# Patient Record
Sex: FEMALE | Race: WHITE | NOT HISPANIC OR LATINO | Employment: FULL TIME | URBAN - METROPOLITAN AREA
[De-identification: names, ages, dates, MRNs, and addresses within clinical notes are randomized per-mention and may not be internally consistent; named-entity substitution may affect disease eponyms.]

---

## 2018-06-10 ENCOUNTER — VIRTUAL VISIT (OUTPATIENT)
Dept: FAMILY MEDICINE | Facility: OTHER | Age: 28
End: 2018-06-10

## 2018-06-11 NOTE — PROGRESS NOTES
"Date:   Clinician: Willard Fiore  Clinician NPI: [id number]  Patient: Misa Carballo  Patient : 1990  Patient Address: [address] [place] [address]  Patient Phone: [phone]  Visit Protocol: URI  Patient Summary:  Misa is a 27 year old ( : 1990 ) female who initiated a Visit for cold, sinus infection, or influenza. When asked the question \"Please sign me up to receive news, health information and promotions. \", Misa responded \"No\".    Misa states her symptoms started gradually 3-6 days ago.   Her symptoms consist of malaise, myalgia, facial pain or pressure, a cough, nasal congestion, a sore throat, a headache, rhinitis, and tooth pain.   Symptom details     Nasal secretions: The color of her mucus is yellow and green.    Cough: Misa coughs a few times an hour and her cough is more bothersome at night. Phlegm does not come into her throat when she coughs.     Sore throat: Misa reports having mild throat pain (between 1-3 on a 10 point pain scale), does not have exudate on her tonsils, and can swallow liquids. She is not sure if the lymph nodes in her neck are enlarged. A rash has not appeared on the skin since the sore throat started.     Facial pain or pressure: The facial pain or pressure feels worse when bending over or leaning forward.     Headache: She states the headache is moderate (between 4-6 on a 10 point pain scale).     Tooth pain: The tooth pain is not caused by a cavity, recent dental work, or other mouth problems.      Misa denies having chills, wheezing, fever, dyspnea, and ear pain. She also denies having recent facial or sinus surgery in the past 60 days, double sickening (worsening symptoms after initial improvement), and having a sinus infection within the past year.   Within the past week, Misa has not been exposed to someone with strep throat. She has not recently been exposed to someone with " "influenza. Misa has not been in close contact with any high risk individuals.   Misa has taken an antibiotic medication in the past month. Antibiotic details as reported by the patient (free text): Macrobid (I?m almost done taking the 100mg capsules once every 12 hours for 5 days). This was for a UTI.    Weight: 153 lbs   Misa does not smoke or use smokeless tobacco.   She denies pregnancy and denies breastfeeding. She has menstruated in the past month.  MEDICATIONS: [{\"id\"=&gt;3558945, \"description\"=&gt;\"valacyclovir oral\"}, {\"id\"=&gt;9939471, \"description\"=&gt;\"Aviane oral\"}, {\"id\"=&gt;7018379, \"description\"=&gt;\"Zyrtec oral\"}, {\"id\"=&gt;6500579, \"description\"=&gt;\"Nexium 24HR oral\"}], ALLERGIES: []  Clinician Response:  Dear Misa,  Based on the information provided, you have acute bacterial sinusitis, also known as a sinus infection. Sinus infections are caused by bacteria or a virus and symptoms are almost always identical. The difference between the 2 types of infections is timing.  Sinus infections start as viral infections and symptoms improve on their own in about 7 days. If symptoms have not improved after 7 days or have even worsened, a bacterial infection may have developed.  Medication information  I am prescribing:     Amoxicillin-pot clavulanate 875-125 mg oral tablet. Take 1 tablet by mouth every 12 hours for 10 days. There are no refills with this prescription.   Antibiotics can cause an allergic reaction even if you have taken them without a problem in the past. If you develop a new rash, swelling, or difficulty breathing, stop the medication and be seen in a clinic or urgent care immediately.  A yeast infection is a side effect of taking antibiotics in some women. Please use [facility] to get treatment if you have symptoms of a yeast infection.  If you become pregnant during this course of treatment, stop taking the medication and contact your primary care provider.  Self care  The " following tips will keep you as comfortable as possible while you recover:     Rest    Drink plenty of water and other liquids    Take a hot shower to loosen congestion    Use throat lozenges    Gargle with warm salt water (1/4 teaspoon of salt per 8 ounce glass of water)    Suck on frozen items such as popsicles or ice cubes    Drink hot tea with lemon and honey    Take a spoonful of honey to reduce your cough     When to seek care  Please be seen in a clinic or urgent care if any of the following occur:     Symptoms do not start to improve after 3 days of treatment    New symptoms develop, or symptoms become worse     Call 911 or go to the emergency room if you feel that your throat is closing off, you suddenly develop a rash, you are unable to swallow fluids, you are drooling, or you are having difficulty breathing.   Diagnosis: Acute bacterial sinusitis  Diagnosis ICD: J01.90  Additional Clinician Notes: Please stop taking the Macrobid, if you?re going to take this antibiotic.   Alternatively, I would wait and finish the Macrobid before starting the new anabiotic as most sinus infections go away on their own. We especially recommend not using antibiotics until symptoms have been present for 7 to 10 days or longer. We recommend over-the-counter medications like Sudafed, Advil, and other cough and cold medicines.  Prescription: amoxicillin-pot clavulanate 875-125 mg oral tablet 20 tablet, 10 days supply. Take 1 tablet by mouth every 12 hours for 10 days. Refills: 0, Refill as needed: no, Allow substitutions: yes  Pharmacy: [facility] #[id number] - [phone] - [address], [place] [address]

## 2018-12-24 ENCOUNTER — VIRTUAL VISIT (OUTPATIENT)
Dept: FAMILY MEDICINE | Facility: OTHER | Age: 28
End: 2018-12-24

## 2018-12-26 NOTE — PROGRESS NOTES
"Date:   Clinician: Celeste Cheng  Clinician NPI: [id number]  Patient: Misa Carballo  Patient : 1990  Patient Address: [address] [place] [address]  Patient Phone: [phone]  Visit Protocol: UTI  Patient Summary:  Misa is a 27 year old ( : 1990 ) female who initiated a Visit for a presumed bladder infection. When asked the question \"Please sign me up to receive news, health information and promotions. \", Misa responded \"No\".   Her symptoms started 1-3 days ago and consist of urinary frequency, urgency, and dysuria.   Symptom details   Urine color: The color of her urine is yellow.    Denied symptoms include urinary incontinence, vaginal discharge, abdominal pain, vomiting, feeling as if the bladder is never empty, flank pain, chills, vaginal itching, foul-smelling urine, and nausea. She does not feel feverish.   Over-the-counter medications or home remedies used to relieve the current symptoms as reported by the patient (free text): iB profin and AZO tablets    Precipitating events  Misa denies having a sexually transmitted disease.  Pertinent medical history  Misa has had a bladder infection before and has had 2 in the past 12 months. Her most recent bladder infection was not within the last 4 weeks. Her current symptoms are similar to her previous bladder infection symptoms.   Sulfamethoxazole-trimethoprim (Bactrim DS) and nitrofurantoin (Macrobid) has been effective in treating her past bladder infections.   Misa does not get yeast infections when she takes antibiotics and has not been prescribed antibiotics to prevent frequent or repeated bladder infections in the past. She has not experienced problems or side effects with any of the common antibiotics used to treat bladder infections.   Misa does not have a history of kidney stones. She has not used a catheter or been a patient in a hospital or nursing home in the " past 2 weeks.   Misa does not smoke or use smokeless tobacco.   She denies pregnancy and denies breastfeeding. She has menstruated in the past month.   Additional information as reported by the patient (free text): I get UTI?s a few times a year. This feels exactly like the rest.    MEDICATIONS: valacyclovir oral, Aviane oral, Zyrtec oral, Nexium 24HR oral, ALLERGIES: NKDA  Clinician Response:  Dear Misa,  Based on the information you have provided, you likely have an acute urinary tract infection, also called a bladder infection. Bladder infections occur when bacteria from the outside of the body enters the urinary tract. Any part of the urinary system can be infected, but the bladder is the most common.  Medication information  I am prescribing:     Sulfamethoxazole-trimethoprim (Bactrim DS) 800-160 mg oral tablet. Take 1 tablet by mouth every 12 hours for 3 days. There are no refills with this prescription.   The medication I prescribed for your bladder infection is an antibiotic. Continue taking the medication until it is gone even if you feel better. If you get an upset stomach while taking antibiotics, taking the medication with food can help.   Yeast infections can be a common side effect of antibiotics. The most common symptom of a yeast infection is itchiness in and around the vagina. Other signs and symptoms include burning, redness, or a thick, white vaginal discharge that looks like cottage cheese and does not have a bad smell.  Self care  Urination helps to flush bacteria from the urinary tract. For this reason, drinking water and urinating often helps relieve some symptoms of a bladder infection and can decrease your risk of getting bladder infections in the future.  Other steps you can take to prevent future bladder infections include:     Wipe front to back after using the bathroom    Urinate after sexual intercourse    Avoid using deodorant sprays, douches, or powders in the vaginal area      When to seek care  Please make an appointment to be seen in a clinic or urgent care if any of the following occur:     You develop new symptoms or your symptoms become worse    You have medication side effects that make it difficult to take them as prescribed    Your symptoms do not improve within 1-2 days of starting treatment    You have symptoms of a bladder infection that return shortly after completing treatment     It is possible to have an allergic reaction to an antibiotic even if you have not had one in the past. If you notice a new rash, significant swelling, or difficulty breathing, stop taking this medication immediately and go to a clinic or urgent care.   Diagnosis: Acute uncomplicated bladder infection  Diagnosis ICD: N39.0  Prescription: sulfamethoxazole-trimethoprim (Bactrim DS) 800-160 mg oral tablet 6 tablet, 3 days supply. Take 1 tablet by mouth every 12 hours for 3 days. Refills: 0, Refill as needed: no, Allow substitutions: yes  Pharmacy: [id number] - [phone] - [address] [place] [address]

## 2022-02-23 ENCOUNTER — ANESTHESIA (OUTPATIENT)
Dept: OBGYN | Facility: CLINIC | Age: 32
End: 2022-02-23
Payer: COMMERCIAL

## 2022-02-23 ENCOUNTER — HOSPITAL ENCOUNTER (INPATIENT)
Facility: CLINIC | Age: 32
LOS: 1 days | Discharge: HOME HEALTH | End: 2022-02-24
Attending: STUDENT IN AN ORGANIZED HEALTH CARE EDUCATION/TRAINING PROGRAM | Admitting: STUDENT IN AN ORGANIZED HEALTH CARE EDUCATION/TRAINING PROGRAM
Payer: COMMERCIAL

## 2022-02-23 ENCOUNTER — ANESTHESIA EVENT (OUTPATIENT)
Dept: OBGYN | Facility: CLINIC | Age: 32
End: 2022-02-23
Payer: COMMERCIAL

## 2022-02-23 PROBLEM — Z36.89 ENCOUNTER FOR TRIAGE IN PREGNANT PATIENT: Status: ACTIVE | Noted: 2022-02-23

## 2022-02-23 LAB
ABO/RH(D): NORMAL
ANTIBODY SCREEN: NEGATIVE
GROUP B STREPTOCOCCUS (EXTERNAL): NEGATIVE
HEPATITIS B SURFACE ANTIGEN (EXTERNAL): NEGATIVE
HGB BLD-MCNC: 13.1 G/DL (ref 11.7–15.7)
HIV1+2 AB SERPL QL IA: NEGATIVE
HOLD SPECIMEN: NORMAL
PLATELET # BLD AUTO: 165 10E3/UL (ref 150–450)
RUBELLA ANTIBODY IGG (EXTERNAL): NORMAL
RUPTURE OF FETAL MEMBRANES BY ROM PLUS: NEGATIVE
SARS-COV-2 RNA RESP QL NAA+PROBE: NEGATIVE
SPECIMEN EXPIRATION DATE: NORMAL

## 2022-02-23 PROCEDURE — 250N000009 HC RX 250: Performed by: ANESTHESIOLOGY

## 2022-02-23 PROCEDURE — 120N000001 HC R&B MED SURG/OB

## 2022-02-23 PROCEDURE — 86901 BLOOD TYPING SEROLOGIC RH(D): CPT | Performed by: STUDENT IN AN ORGANIZED HEALTH CARE EDUCATION/TRAINING PROGRAM

## 2022-02-23 PROCEDURE — 00HU33Z INSERTION OF INFUSION DEVICE INTO SPINAL CANAL, PERCUTANEOUS APPROACH: ICD-10-PCS | Performed by: ANESTHESIOLOGY

## 2022-02-23 PROCEDURE — 722N000001 HC LABOR CARE VAGINAL DELIVERY SINGLE

## 2022-02-23 PROCEDURE — 258N000003 HC RX IP 258 OP 636: Performed by: STUDENT IN AN ORGANIZED HEALTH CARE EDUCATION/TRAINING PROGRAM

## 2022-02-23 PROCEDURE — 0HQ9XZZ REPAIR PERINEUM SKIN, EXTERNAL APPROACH: ICD-10-PCS | Performed by: STUDENT IN AN ORGANIZED HEALTH CARE EDUCATION/TRAINING PROGRAM

## 2022-02-23 PROCEDURE — 85018 HEMOGLOBIN: CPT | Performed by: STUDENT IN AN ORGANIZED HEALTH CARE EDUCATION/TRAINING PROGRAM

## 2022-02-23 PROCEDURE — 86850 RBC ANTIBODY SCREEN: CPT | Performed by: STUDENT IN AN ORGANIZED HEALTH CARE EDUCATION/TRAINING PROGRAM

## 2022-02-23 PROCEDURE — 10907ZC DRAINAGE OF AMNIOTIC FLUID, THERAPEUTIC FROM PRODUCTS OF CONCEPTION, VIA NATURAL OR ARTIFICIAL OPENING: ICD-10-PCS | Performed by: STUDENT IN AN ORGANIZED HEALTH CARE EDUCATION/TRAINING PROGRAM

## 2022-02-23 PROCEDURE — 86780 TREPONEMA PALLIDUM: CPT | Performed by: STUDENT IN AN ORGANIZED HEALTH CARE EDUCATION/TRAINING PROGRAM

## 2022-02-23 PROCEDURE — 36415 COLL VENOUS BLD VENIPUNCTURE: CPT | Performed by: STUDENT IN AN ORGANIZED HEALTH CARE EDUCATION/TRAINING PROGRAM

## 2022-02-23 PROCEDURE — 250N000011 HC RX IP 250 OP 636: Performed by: ANESTHESIOLOGY

## 2022-02-23 PROCEDURE — G0463 HOSPITAL OUTPT CLINIC VISIT: HCPCS

## 2022-02-23 PROCEDURE — 370N000003 HC ANESTHESIA WARD SERVICE

## 2022-02-23 PROCEDURE — 87635 SARS-COV-2 COVID-19 AMP PRB: CPT | Performed by: STUDENT IN AN ORGANIZED HEALTH CARE EDUCATION/TRAINING PROGRAM

## 2022-02-23 PROCEDURE — 84112 EVAL AMNIOTIC FLUID PROTEIN: CPT | Performed by: STUDENT IN AN ORGANIZED HEALTH CARE EDUCATION/TRAINING PROGRAM

## 2022-02-23 PROCEDURE — 85049 AUTOMATED PLATELET COUNT: CPT | Performed by: STUDENT IN AN ORGANIZED HEALTH CARE EDUCATION/TRAINING PROGRAM

## 2022-02-23 PROCEDURE — 250N000009 HC RX 250: Performed by: STUDENT IN AN ORGANIZED HEALTH CARE EDUCATION/TRAINING PROGRAM

## 2022-02-23 PROCEDURE — 250N000011 HC RX IP 250 OP 636: Performed by: STUDENT IN AN ORGANIZED HEALTH CARE EDUCATION/TRAINING PROGRAM

## 2022-02-23 PROCEDURE — 3E0R3BZ INTRODUCTION OF ANESTHETIC AGENT INTO SPINAL CANAL, PERCUTANEOUS APPROACH: ICD-10-PCS | Performed by: ANESTHESIOLOGY

## 2022-02-23 RX ORDER — EPHEDRINE SULFATE 50 MG/ML
5 INJECTION, SOLUTION INTRAMUSCULAR; INTRAVENOUS; SUBCUTANEOUS
Status: DISCONTINUED | OUTPATIENT
Start: 2022-02-23 | End: 2022-02-23 | Stop reason: HOSPADM

## 2022-02-23 RX ORDER — MISOPROSTOL 200 UG/1
800 TABLET ORAL
Status: DISCONTINUED | OUTPATIENT
Start: 2022-02-23 | End: 2022-02-23 | Stop reason: HOSPADM

## 2022-02-23 RX ORDER — HYDROCORTISONE 2.5 %
CREAM (GRAM) TOPICAL 3 TIMES DAILY PRN
Status: DISCONTINUED | OUTPATIENT
Start: 2022-02-23 | End: 2022-02-25 | Stop reason: HOSPADM

## 2022-02-23 RX ORDER — KETOROLAC TROMETHAMINE 30 MG/ML
30 INJECTION, SOLUTION INTRAMUSCULAR; INTRAVENOUS
Status: DISCONTINUED | OUTPATIENT
Start: 2022-02-23 | End: 2022-02-25 | Stop reason: HOSPADM

## 2022-02-23 RX ORDER — MODIFIED LANOLIN
OINTMENT (GRAM) TOPICAL
Status: DISCONTINUED | OUTPATIENT
Start: 2022-02-23 | End: 2022-02-25 | Stop reason: HOSPADM

## 2022-02-23 RX ORDER — IBUPROFEN 800 MG/1
800 TABLET, FILM COATED ORAL EVERY 6 HOURS PRN
Status: DISCONTINUED | OUTPATIENT
Start: 2022-02-23 | End: 2022-02-25 | Stop reason: HOSPADM

## 2022-02-23 RX ORDER — NALOXONE HYDROCHLORIDE 0.4 MG/ML
0.4 INJECTION, SOLUTION INTRAMUSCULAR; INTRAVENOUS; SUBCUTANEOUS
Status: DISCONTINUED | OUTPATIENT
Start: 2022-02-23 | End: 2022-02-23 | Stop reason: HOSPADM

## 2022-02-23 RX ORDER — PANTOPRAZOLE SODIUM 40 MG/1
40 TABLET, DELAYED RELEASE ORAL
Status: DISCONTINUED | OUTPATIENT
Start: 2022-02-24 | End: 2022-02-25 | Stop reason: HOSPADM

## 2022-02-23 RX ORDER — DOCUSATE SODIUM 100 MG/1
100 CAPSULE, LIQUID FILLED ORAL DAILY
Status: DISCONTINUED | OUTPATIENT
Start: 2022-02-24 | End: 2022-02-25 | Stop reason: HOSPADM

## 2022-02-23 RX ORDER — OXYTOCIN/0.9 % SODIUM CHLORIDE 30/500 ML
100-340 PLASTIC BAG, INJECTION (ML) INTRAVENOUS CONTINUOUS PRN
Status: DISCONTINUED | OUTPATIENT
Start: 2022-02-23 | End: 2022-02-25 | Stop reason: HOSPADM

## 2022-02-23 RX ORDER — LIDOCAINE 40 MG/G
CREAM TOPICAL
Status: DISCONTINUED | OUTPATIENT
Start: 2022-02-23 | End: 2022-02-23

## 2022-02-23 RX ORDER — ONDANSETRON 2 MG/ML
4 INJECTION INTRAMUSCULAR; INTRAVENOUS EVERY 6 HOURS PRN
Status: DISCONTINUED | OUTPATIENT
Start: 2022-02-23 | End: 2022-02-23

## 2022-02-23 RX ORDER — OXYTOCIN 10 [USP'U]/ML
10 INJECTION, SOLUTION INTRAMUSCULAR; INTRAVENOUS
Status: DISCONTINUED | OUTPATIENT
Start: 2022-02-23 | End: 2022-02-23 | Stop reason: HOSPADM

## 2022-02-23 RX ORDER — CARBOPROST TROMETHAMINE 250 UG/ML
250 INJECTION, SOLUTION INTRAMUSCULAR
Status: DISCONTINUED | OUTPATIENT
Start: 2022-02-23 | End: 2022-02-25 | Stop reason: HOSPADM

## 2022-02-23 RX ORDER — SODIUM CHLORIDE, SODIUM LACTATE, POTASSIUM CHLORIDE, CALCIUM CHLORIDE 600; 310; 30; 20 MG/100ML; MG/100ML; MG/100ML; MG/100ML
INJECTION, SOLUTION INTRAVENOUS CONTINUOUS PRN
Status: DISCONTINUED | OUTPATIENT
Start: 2022-02-23 | End: 2022-02-23 | Stop reason: HOSPADM

## 2022-02-23 RX ORDER — LIDOCAINE HYDROCHLORIDE AND EPINEPHRINE 15; 5 MG/ML; UG/ML
INJECTION, SOLUTION EPIDURAL PRN
Status: DISCONTINUED | OUTPATIENT
Start: 2022-02-23 | End: 2022-02-23

## 2022-02-23 RX ORDER — OXYTOCIN/0.9 % SODIUM CHLORIDE 30/500 ML
340 PLASTIC BAG, INJECTION (ML) INTRAVENOUS CONTINUOUS PRN
Status: DISCONTINUED | OUTPATIENT
Start: 2022-02-23 | End: 2022-02-23 | Stop reason: HOSPADM

## 2022-02-23 RX ORDER — MISOPROSTOL 200 UG/1
400 TABLET ORAL
Status: DISCONTINUED | OUTPATIENT
Start: 2022-02-23 | End: 2022-02-23 | Stop reason: HOSPADM

## 2022-02-23 RX ORDER — PROCHLORPERAZINE MALEATE 10 MG
10 TABLET ORAL EVERY 6 HOURS PRN
Status: DISCONTINUED | OUTPATIENT
Start: 2022-02-23 | End: 2022-02-23 | Stop reason: HOSPADM

## 2022-02-23 RX ORDER — CARBOPROST TROMETHAMINE 250 UG/ML
250 INJECTION, SOLUTION INTRAMUSCULAR
Status: DISCONTINUED | OUTPATIENT
Start: 2022-02-23 | End: 2022-02-23 | Stop reason: HOSPADM

## 2022-02-23 RX ORDER — NALOXONE HYDROCHLORIDE 0.4 MG/ML
0.2 INJECTION, SOLUTION INTRAMUSCULAR; INTRAVENOUS; SUBCUTANEOUS
Status: DISCONTINUED | OUTPATIENT
Start: 2022-02-23 | End: 2022-02-23 | Stop reason: HOSPADM

## 2022-02-23 RX ORDER — METHYLERGONOVINE MALEATE 0.2 MG/ML
200 INJECTION INTRAVENOUS
Status: DISCONTINUED | OUTPATIENT
Start: 2022-02-23 | End: 2022-02-23 | Stop reason: HOSPADM

## 2022-02-23 RX ORDER — LIDOCAINE 40 MG/G
CREAM TOPICAL
Status: DISCONTINUED | OUTPATIENT
Start: 2022-02-23 | End: 2022-02-23 | Stop reason: HOSPADM

## 2022-02-23 RX ORDER — OXYTOCIN/0.9 % SODIUM CHLORIDE 30/500 ML
340 PLASTIC BAG, INJECTION (ML) INTRAVENOUS CONTINUOUS PRN
Status: DISCONTINUED | OUTPATIENT
Start: 2022-02-23 | End: 2022-02-25 | Stop reason: HOSPADM

## 2022-02-23 RX ORDER — OXYTOCIN/0.9 % SODIUM CHLORIDE 30/500 ML
1-24 PLASTIC BAG, INJECTION (ML) INTRAVENOUS CONTINUOUS
Status: DISCONTINUED | OUTPATIENT
Start: 2022-02-23 | End: 2022-02-23 | Stop reason: HOSPADM

## 2022-02-23 RX ORDER — VALACYCLOVIR HYDROCHLORIDE 500 MG/1
500 TABLET, FILM COATED ORAL 2 TIMES DAILY
Status: ON HOLD | COMMUNITY
End: 2022-02-24

## 2022-02-23 RX ORDER — ONDANSETRON 2 MG/ML
4 INJECTION INTRAMUSCULAR; INTRAVENOUS EVERY 6 HOURS PRN
Status: DISCONTINUED | OUTPATIENT
Start: 2022-02-23 | End: 2022-02-23 | Stop reason: HOSPADM

## 2022-02-23 RX ORDER — OXYTOCIN 10 [USP'U]/ML
10 INJECTION, SOLUTION INTRAMUSCULAR; INTRAVENOUS
Status: DISCONTINUED | OUTPATIENT
Start: 2022-02-23 | End: 2022-02-25 | Stop reason: HOSPADM

## 2022-02-23 RX ORDER — NALBUPHINE HYDROCHLORIDE 10 MG/ML
2.5-5 INJECTION, SOLUTION INTRAMUSCULAR; INTRAVENOUS; SUBCUTANEOUS EVERY 6 HOURS PRN
Status: DISCONTINUED | OUTPATIENT
Start: 2022-02-23 | End: 2022-02-25 | Stop reason: HOSPADM

## 2022-02-23 RX ORDER — TRANEXAMIC ACID 10 MG/ML
1 INJECTION, SOLUTION INTRAVENOUS EVERY 30 MIN PRN
Status: DISCONTINUED | OUTPATIENT
Start: 2022-02-23 | End: 2022-02-25 | Stop reason: HOSPADM

## 2022-02-23 RX ORDER — PROCHLORPERAZINE 25 MG
25 SUPPOSITORY, RECTAL RECTAL EVERY 12 HOURS PRN
Status: DISCONTINUED | OUTPATIENT
Start: 2022-02-23 | End: 2022-02-23 | Stop reason: HOSPADM

## 2022-02-23 RX ORDER — ACETAMINOPHEN 325 MG/1
650 TABLET ORAL EVERY 4 HOURS PRN
Status: DISCONTINUED | OUTPATIENT
Start: 2022-02-23 | End: 2022-02-25 | Stop reason: HOSPADM

## 2022-02-23 RX ORDER — FENTANYL CITRATE 50 UG/ML
50-100 INJECTION, SOLUTION INTRAMUSCULAR; INTRAVENOUS
Status: DISCONTINUED | OUTPATIENT
Start: 2022-02-23 | End: 2022-02-23 | Stop reason: HOSPADM

## 2022-02-23 RX ORDER — TRANEXAMIC ACID 10 MG/ML
1 INJECTION, SOLUTION INTRAVENOUS EVERY 30 MIN PRN
Status: DISCONTINUED | OUTPATIENT
Start: 2022-02-23 | End: 2022-02-23 | Stop reason: HOSPADM

## 2022-02-23 RX ORDER — BISACODYL 10 MG
10 SUPPOSITORY, RECTAL RECTAL DAILY PRN
Status: DISCONTINUED | OUTPATIENT
Start: 2022-02-23 | End: 2022-02-25 | Stop reason: HOSPADM

## 2022-02-23 RX ORDER — ONDANSETRON 4 MG/1
4 TABLET, ORALLY DISINTEGRATING ORAL EVERY 6 HOURS PRN
Status: DISCONTINUED | OUTPATIENT
Start: 2022-02-23 | End: 2022-02-23 | Stop reason: HOSPADM

## 2022-02-23 RX ORDER — METOCLOPRAMIDE 10 MG/1
10 TABLET ORAL EVERY 6 HOURS PRN
Status: DISCONTINUED | OUTPATIENT
Start: 2022-02-23 | End: 2022-02-23 | Stop reason: HOSPADM

## 2022-02-23 RX ORDER — MISOPROSTOL 200 UG/1
800 TABLET ORAL
Status: DISCONTINUED | OUTPATIENT
Start: 2022-02-23 | End: 2022-02-25 | Stop reason: HOSPADM

## 2022-02-23 RX ORDER — IBUPROFEN 600 MG/1
600 TABLET, FILM COATED ORAL
Status: DISCONTINUED | OUTPATIENT
Start: 2022-02-23 | End: 2022-02-25 | Stop reason: HOSPADM

## 2022-02-23 RX ORDER — SODIUM CHLORIDE, SODIUM LACTATE, POTASSIUM CHLORIDE, CALCIUM CHLORIDE 600; 310; 30; 20 MG/100ML; MG/100ML; MG/100ML; MG/100ML
INJECTION, SOLUTION INTRAVENOUS CONTINUOUS
Status: DISCONTINUED | OUTPATIENT
Start: 2022-02-23 | End: 2022-02-23 | Stop reason: HOSPADM

## 2022-02-23 RX ORDER — SCOLOPAMINE TRANSDERMAL SYSTEM 1 MG/1
1 PATCH, EXTENDED RELEASE TRANSDERMAL ONCE
Status: DISCONTINUED | OUTPATIENT
Start: 2022-02-23 | End: 2022-02-23 | Stop reason: HOSPADM

## 2022-02-23 RX ORDER — ONDANSETRON 4 MG/1
4 TABLET, ORALLY DISINTEGRATING ORAL EVERY 6 HOURS PRN
Status: DISCONTINUED | OUTPATIENT
Start: 2022-02-23 | End: 2022-02-23

## 2022-02-23 RX ORDER — METOCLOPRAMIDE HYDROCHLORIDE 5 MG/ML
10 INJECTION INTRAMUSCULAR; INTRAVENOUS EVERY 6 HOURS PRN
Status: DISCONTINUED | OUTPATIENT
Start: 2022-02-23 | End: 2022-02-23 | Stop reason: HOSPADM

## 2022-02-23 RX ORDER — METHYLERGONOVINE MALEATE 0.2 MG/ML
200 INJECTION INTRAVENOUS
Status: DISCONTINUED | OUTPATIENT
Start: 2022-02-23 | End: 2022-02-25 | Stop reason: HOSPADM

## 2022-02-23 RX ORDER — MISOPROSTOL 200 UG/1
400 TABLET ORAL
Status: DISCONTINUED | OUTPATIENT
Start: 2022-02-23 | End: 2022-02-25 | Stop reason: HOSPADM

## 2022-02-23 RX ADMIN — LIDOCAINE HYDROCHLORIDE,EPINEPHRINE BITARTRATE 3 ML: 15; .005 INJECTION, SOLUTION EPIDURAL; INFILTRATION; INTRACAUDAL; PERINEURAL at 20:42

## 2022-02-23 RX ADMIN — Medication 2 MILLI-UNITS/MIN: at 17:20

## 2022-02-23 RX ADMIN — Medication 340 ML/HR: at 21:32

## 2022-02-23 RX ADMIN — FENTANYL CITRATE 100 MCG: 50 INJECTION, SOLUTION INTRAMUSCULAR; INTRAVENOUS at 18:29

## 2022-02-23 RX ADMIN — LIDOCAINE HYDROCHLORIDE,EPINEPHRINE BITARTRATE 2 ML: 15; .005 INJECTION, SOLUTION EPIDURAL; INFILTRATION; INTRACAUDAL; PERINEURAL at 20:45

## 2022-02-23 RX ADMIN — FENTANYL CITRATE 100 MCG: 50 INJECTION, SOLUTION INTRAMUSCULAR; INTRAVENOUS at 20:35

## 2022-02-23 RX ADMIN — Medication: at 21:06

## 2022-02-23 RX ADMIN — FENTANYL CITRATE 100 MCG: 50 INJECTION, SOLUTION INTRAMUSCULAR; INTRAVENOUS at 19:34

## 2022-02-23 RX ADMIN — Medication 10 ML/HR: at 20:46

## 2022-02-23 RX ADMIN — SODIUM CHLORIDE, POTASSIUM CHLORIDE, SODIUM LACTATE AND CALCIUM CHLORIDE: 600; 310; 30; 20 INJECTION, SOLUTION INTRAVENOUS at 14:46

## 2022-02-23 ASSESSMENT — ACTIVITIES OF DAILY LIVING (ADL)
ADLS_ACUITY_SCORE: 10
ADLS_ACUITY_SCORE: 3

## 2022-02-23 NOTE — H&P
[facility] Labor and Delivery History and Physical    Misa Carballo MRN# [id number]   Age: 31 year old YOB: 1990     Date of Admission:  2022    Primary care provider: No primary care provider on file.         HPI:   Misa Carballo is a 31 year old  at 39w4d by LMP c/w 8w1d US who presents with spontaneous contractions, mucous discharge and bloody show. She normally gets her prenatal care with [facility] and was planning delivery at [facility]. Notes some vaginal discharge, unsure if ROM. Notes normal fetal movement.     Pregnancy notable for:  1. History of macrosomia 10#5oz  2. Left ovarian cyst, 5.5cm  3. History of genital herpes           Pregnancy history:     OBSTETRIC HISTORY:    OB History    Para Term  AB Living   2 1 1 0 0 1   SAB IAB Ectopic Multiple Live Births   0 0 0 0 1      # Outcome Date GA Lbr Enrique/2nd Weight Sex Delivery Anes PTL Lv   2 Current            1 Term 20    F    ELVA     Prenatal Labs:   GBS neg  RPR neg    Rubella immune  Gc/Ch neg  O pos sarah neg   Hep B/C neg  HIV neg    Medication Prior to Admission  Medications Prior to Admission   Medication Sig Dispense Refill Last Dose     esomeprazole (NEXIUM) 20 MG capsule Take 20 mg by mouth every morning (before breakfast). Take 30-60 minutes before eating.   2022 at Unknown time     Multiple Vitamin (MULTIVITAMIN OR) Take 1 tablet by mouth daily.   2022 at Unknown time     valACYclovir (VALTREX) 500 MG tablet Take 500 mg by mouth 2 times daily   2022 at Unknown time     sertraline (ZOLOFT) 50 MG tablet Take 50 mg by mouth daily.      .       Maternal Past Medical History:   History reviewed. No pertinent past medical history.       Maternal Past Surgical History:   History reviewed. No pertinent surgical history.          Family History:   History reviewed. No pertinent family history.         Physical Exam:     Vitals:    22 1150    Resp: 16   Temp: 97.9  F (36.6  C)   TempSrc: Oral     Gen: Well appearing, no apparent distress  Cardio: RRR  Resp: Breathing comfortably on room air  Abdomen: gravid, soft, nontender. EFW 8.5#  : no herpes lesions   Cervix: 3.5/70 per RN x2  Presentation:Cephalic by cervical exam    Fetal Heart Rate Tracing: Baseline 140, moderate variability, accels present, no decels  Tocometer: 2 contractions in 10 minutes    Imaging:  F/u growth US 2/3/22  FEW 3347g 84%, AC 92%    Anatomy US normal with no change in left adnexal lesion         Assessment:   Misa Carballo is a 31 year old  at 39w4d by LMP c/w 8w1d US admitted for augmentation of early labor. Discussed option of discharge to home vs augmentation and she desires to stay for augmentation.        Plan:     #Augmentation of labor  - Admit to labor and delivery   - Plan for AROM +\- pitocin once labs collected and IV in place  - Membranes: intact  - Labs: CBC, T&S, RPR, Covid ordered   - GBS neg; Antibiotics not indicated  - Pain Control: Desires epidural in active labor  - Diet: Regular in early labor.  - FWB: Cat I tracing  - Anticipate      History of macrosomia 10#5oz  - last growth US 3347g 84%    History of genital herpes   - taking prophylactic Valtrex  - no lesions seen on exam     Priscilla Dennis MD

## 2022-02-23 NOTE — PROVIDER NOTIFICATION
MD at bedside. Pt cervix unchanged but due to pt anitra and being uncomfortable will keep for induction. GBS neg. Pt is a divert from [facility], was scheduled for induction tomorrow due to hx of macrosomia.

## 2022-02-23 NOTE — PROVIDER NOTIFICATION
02/23/22 1714   Provider Notification   Provider Name/Title Dr Dennis   Method of Notification In Department   Request Evaluate - Remote   Notification Reason Uterine Activity   Updated MD that cxns are 2-8 minutes apart, more spaced than not, sve at 1400. MD wrote orders to start pitocin and no sve needed now since pt still very comfortable.

## 2022-02-23 NOTE — PLAN OF CARE
Data: [facility]: 2022 11:00 AM.  Reason for maternal/fetal assessment is uterine contractions. Patient reports getting membranes stripped in the clinic yesterday, has had intermittent contractions with mucus and some spotting since.Pt also feels that she may be leaking a small amount but cant tell if its just cervical mucous. Patient is a .  Prenatal record reviewed. Pregnancy  has been complicated by hx of HSV, taking valtrex, no current outbreak, and hx of macrosomia (10lb 5 oz at 41 weeks, no shoulder dystocia).  Gestational Age Unknown. VSS. Fetal movement active. Patient denies nausea, vomiting, headache, visual disturbances, epigastric or URQ pain, significant edema. Support person is present.   Action: Verbal consent for EFM. Triage assessment completed. Bill of rights reviewed.  Response: Patient verbalized agreement with plan. Will contact Dr Priscilla Dennis with update and for further orders.

## 2022-02-23 NOTE — PROVIDER NOTIFICATION
Dr Priscilla Dennis informed of patient arrival and assessment including the following:    Reason for maternal/fetal assessment uterine contractions. Pt reports getting membranes stripped in the clinic yesterday, has had intermittent contractions with mucus and some spotting since.Pt also feels that she may be leaking a small amount but cant tell if its just cervical mucous. Patient is a .  Prenatal record reviewed. Pregnancy  has been complicated by hx of HSV, taking valtrex, no current outbreak, and hx of macrosomia (10lb 5 oz at 41 weeks, no shoulder dystocia).. Fetal status normal baseline, moderate variability, accelerations present and no decelerations. Plan per provider/orders received for ROM plus and cervical exam.

## 2022-02-24 VITALS
RESPIRATION RATE: 16 BRPM | DIASTOLIC BLOOD PRESSURE: 81 MMHG | SYSTOLIC BLOOD PRESSURE: 127 MMHG | TEMPERATURE: 97.7 F | HEART RATE: 76 BPM

## 2022-02-24 LAB — T PALLIDUM AB SER QL: NONREACTIVE

## 2022-02-24 PROCEDURE — 120N000001 HC R&B MED SURG/OB

## 2022-02-24 PROCEDURE — 250N000013 HC RX MED GY IP 250 OP 250 PS 637: Performed by: STUDENT IN AN ORGANIZED HEALTH CARE EDUCATION/TRAINING PROGRAM

## 2022-02-24 RX ORDER — DOCUSATE SODIUM 100 MG/1
100 CAPSULE, LIQUID FILLED ORAL DAILY
Qty: 30 CAPSULE | Refills: 0 | Status: SHIPPED | OUTPATIENT
Start: 2022-02-24

## 2022-02-24 RX ORDER — MODIFIED LANOLIN
OINTMENT (GRAM) TOPICAL
Qty: 7 G | Refills: 0 | Status: SHIPPED | OUTPATIENT
Start: 2022-02-24

## 2022-02-24 RX ORDER — IBUPROFEN 800 MG/1
800 TABLET, FILM COATED ORAL EVERY 6 HOURS PRN
Qty: 40 TABLET | Refills: 0 | Status: SHIPPED | OUTPATIENT
Start: 2022-02-24

## 2022-02-24 RX ORDER — HYDROCORTISONE 2.5 %
CREAM (GRAM) TOPICAL 3 TIMES DAILY PRN
Qty: 30 G | Refills: 0 | Status: SHIPPED | OUTPATIENT
Start: 2022-02-24

## 2022-02-24 RX ADMIN — ACETAMINOPHEN 650 MG: 325 TABLET, FILM COATED ORAL at 12:23

## 2022-02-24 RX ADMIN — ACETAMINOPHEN 650 MG: 325 TABLET, FILM COATED ORAL at 16:21

## 2022-02-24 RX ADMIN — DOCUSATE SODIUM 100 MG: 100 CAPSULE, LIQUID FILLED ORAL at 11:12

## 2022-02-24 RX ADMIN — IBUPROFEN 800 MG: 800 TABLET ORAL at 21:35

## 2022-02-24 RX ADMIN — ACETAMINOPHEN 650 MG: 325 TABLET, FILM COATED ORAL at 21:34

## 2022-02-24 RX ADMIN — ACETAMINOPHEN 650 MG: 325 TABLET, FILM COATED ORAL at 07:54

## 2022-02-24 RX ADMIN — ACETAMINOPHEN 650 MG: 325 TABLET, FILM COATED ORAL at 03:46

## 2022-02-24 RX ADMIN — IBUPROFEN 800 MG: 800 TABLET ORAL at 00:44

## 2022-02-24 RX ADMIN — IBUPROFEN 800 MG: 800 TABLET ORAL at 14:49

## 2022-02-24 RX ADMIN — PANTOPRAZOLE SODIUM 40 MG: 40 TABLET, DELAYED RELEASE ORAL at 06:42

## 2022-02-24 RX ADMIN — IBUPROFEN 800 MG: 800 TABLET ORAL at 06:42

## 2022-02-24 ASSESSMENT — ACTIVITIES OF DAILY LIVING (ADL)
ADLS_ACUITY_SCORE: 3
ADLS_ACUITY_SCORE: 5
ADLS_ACUITY_SCORE: 3
ADLS_ACUITY_SCORE: 5
ADLS_ACUITY_SCORE: 3
ADLS_ACUITY_SCORE: 5
ADLS_ACUITY_SCORE: 3

## 2022-02-24 NOTE — PROGRESS NOTES
[facility]  Labor Progress Note    S:  Patient feeling some contractions still, agreeable to AROM.    O:   Patient Vitals for the past 4 hrs:   BP Temp Temp src Resp   22 1513 118/74 98.5  F (36.9  C) Oral 16     SVE: 3.5/70/-2, AROM at 1800 with clear fluid    FHT: Baseline 140, moderate variability, accelerations present, occasional early decelerations  [place]: 3 contractions in 10 minutes    A/P:  Ms. Misa Carballo is a 31 year old  at 39w4d by LMP c/w 8w1d US, here for augmentation of early labor.    Labor: - continue titrating pitocin to ctx, now s/p AROM   - Monitoring: external, continuous   - GBS neg   - Pain control: fentanyl>epidural   - anticipate        Leah Henke, MD [facility]  22 6:15 PM

## 2022-02-24 NOTE — L&D DELIVERY NOTE
Delivery Summary    Misa Carballo MRN# [id number]   Age: 31 year old YOB: 1990     Misa Carballo is a 31 year old  at 39w4d by LMP c/w 8w1d US who was admitted for augmentation of early labor on 2022. Pregnancy was notable for history of macrosomia, left ovarian cyst and history of genital herpes. Upon admission her cervix was 3.5/70/-2. She received pitocin for augmentation. AROM was performed at  with clear fluid. She received an epidural with good pain relief. She was complete at  and began pushing at . At  a vigorous female infant was delivered in the OA position with apgars of 9 and 9. Weight 3750g. Delayed cord clamping was done for >60 seconds. The placenta delivered spontaneously, intact with a 3 vessel cord at . Pitocin was given after delivery of the baby. A 1st degree laceration was present and was repaired in the standard fashion with 3-0 Monocryl. QBL of 50mL. Sponge and sharp counts were correct.     Priscilla Dennis MD         Haris, Female-Misa [[id number]]    Labor Event Times    Labor onset date: 22 Onset time:  7:30 PM   Dilation complete date: 22 Complete time:  9:15 PM   Start pushing date/time: 2022      Labor Length    1st Stage (hrs): 1 (min): 45   2nd Stage (hrs): 0 (min): 14   3rd Stage (hrs): 0 (min): 3      Labor Events     labor?: No   steroids: None  Labor Type: AROM, Augmentation  Predominate monitoring during 1st stage: continuous electronic fetal monitoring     Antibiotics received during labor?: No     Rupture date/time: 228   Rupture type: Artificial Rupture of Membranes  Fluid color: Clear  Fluid odor: Normal     Augmentation: Oxytocin  Augmentation date/time: 22   Indications for augmentation: Ineffective Contraction Pattern  1:1 continuous labor support provided by?: RN Labor partogram used?: no      Delivery/Placenta Date and Time    Delivery  "Date: 22 Delivery Time:  9:29 PM   Placenta Date/Time: 2022  9:33 PM  Oxytocin given at the time of delivery: after delivery of baby  Delivering clinician: Priscilla Dennis MD   Other personnel present at delivery:  Provider Role   Jenny Maria, RN Delivery Nurse   Avril Baig, RN Delivery Assist         Vaginal Counts     Initial count performed by 2 team members:  Two Team Members   Jenny Dennis       Needles Suture Needles Sponges (RETIRED) Instruments   Initial counts 2  5    Added to count  1     Relief counts       Final counts 2 1 5          Placed during labor Accounted for at the end of labor   FSE No NA   IUPC No NA   Cervadil No NA              Final count performed by 2 team members:  Two Team Members   Kya Dennis      Final count correct?: Yes     Apgars    Living status: Living   1 Minute 5 Minute 10 Minute 15 Minute 20 Minute   Skin color: 1  1       Heart rate: 2  2       Reflex irritability: 2  2       Muscle tone: 2  2       Respiratory effort: 2  2       Total: 9  9       Apgars assigned by: ERIC MICHELLE     Cord    Vessels: 3 Vessels    Cord Complications: None               Cord Blood Disposition: Lab    Gases Sent?: No    Delayed cord clamping?: Yes    Cord Clamping Delay (seconds):  seconds    Stem cell collection?: No        Resuscitation    Methods: None  Output in Delivery Room: Voided     [place] Measurements    Weight: 8 lb 4.3 oz Length: 1' 7.49\"   Head circumference: 37 cm    Output in delivery room: Voided     Skin to Skin and Feeding Plan    Skin to skin initiation date/time: 1841    Skin to skin with: Mother  Skin to skin end date/time:     Breastfeeding initiated date/time: 2022     Labor Events and Shoulder Dystocia    Fetal Tracing Prior to Delivery: Category 1  Shoulder dystocia present?: Neg     Delivery (Maternal) (Provider to Complete) ([id number])    Episiotomy: None  Perineal lacerations: 1st Repaired?: Yes "   Repair suture: 3-0 Monocryl  Number of repair packets: 1  Genital tract inspection done: Pos     Blood Loss  Mother: Misa Carballo #[id number]   Start of Mother's Information    Delivery Blood Loss  02/23/22 1930 - 02/24/22 0224    Delivery QBL (mL) Hospital Encounter 50 mL    Total  50 mL         End of Mother's Information  Mother: Misa Carballo #[id number]          Delivery - Provider to Complete ([id number])    Delivering clinician: Priscilla Dennis MD  Attempted Delivery Types (Choose all that apply): Spontaneous Vaginal Delivery  Delivery Type (Choose the 1 that will go to the Birth History): Vaginal, Spontaneous                   Other personnel:  Provider Role   Jenny Maria, RN Delivery Nurse   Avril Baig RN Delivery Assist                Placenta    Date/Time: 2/23/2022  9:33 PM  Removal: Expressed  Disposition: Hospital disposal           Anesthesia    Method: Epidural  Cervical dilation at placement: 0-3                Presentation and Position    Presentation: Vertex    Position: Middle Occiput Anterior                 Priscilla Dennis MD

## 2022-02-24 NOTE — PROGRESS NOTES
Patient had a vaginal delivery. VSS. Fundus firm. Lochia scant. Breastfeeding and demonstrating positive bonding behaviors with . Pain controlled with pain medications (see eMar). Will continue to monitor.

## 2022-02-24 NOTE — PLAN OF CARE
Data: Misa Carballo transferred to 452 via wheelchair at 1205. Baby transferred via parent's arms.  Action: Receiving unit notified of transfer: Yes. Patient and family notified of room change. Report given to Filomena REYES at 1205. Belongings sent to receiving unit. Accompanied by Registered Nurse. Oriented patient to surroundings. Call light within reach. ID bands double-checked with receiving RN.  Response: Patient tolerated transfer and is stable.    Patients mobililty level scored using the bedside mobility assistance tool (BMAT). Patient is at a mobility level test number: 3. Mobility equipment used: none required. Required assist of 1 staff members. Further use of BMAT scoring not required.   Brain metastases

## 2022-02-24 NOTE — PROVIDER NOTIFICATION
02/23/22 2115   Provider Notification   Provider Name/Title    Request Attend Delivery     Pt got an epidural at 2050, feeling pressure. SVE complete.  called to attend delivery.

## 2022-02-24 NOTE — ANESTHESIA PREPROCEDURE EVALUATION
Anesthesia Pre-Procedure Evaluation    Patient: Misa Carballo   MRN: [id number] : 1990        Procedure :           History reviewed. No pertinent past medical history.   History reviewed. No pertinent surgical history.   No Known Allergies   Social History     Tobacco Use     Smoking status: Never Smoker     Smokeless tobacco: Never Used   Substance Use Topics     Alcohol use: Yes      Wt Readings from Last 1 Encounters:   13 62.1 kg (136 lb 14.4 oz)        Anesthesia Evaluation   Pt has had prior anesthetic. Type: OB Labor Epidural.    No history of anesthetic complications       ROS/MED HX  ENT/Pulmonary:  - neg pulmonary ROS     Neurologic:  - neg neurologic ROS     Cardiovascular:  - neg cardiovascular ROS     METS/Exercise Tolerance:     Hematologic:  - neg hematologic  ROS   (+) no anticoagulation therapy,     Musculoskeletal:   (+) kyphoscoliosis lumbar spine     GI/Hepatic:       Renal/Genitourinary:       Endo:  - neg endo ROS     Psychiatric/Substance Use:       Infectious Disease:       Malignancy:       Other:   Spontaneous labor         Physical Exam    Airway        Mallampati: III    Neck ROM: full     Respiratory Devices and Support         Dental           Cardiovascular   cardiovascular exam normal          Pulmonary   pulmonary exam normal                OUTSIDE LABS:  CBC:   Lab Results   Component Value Date    HGB 13.1 2022     2022     BMP: No results found for: NA, POTASSIUM, CHLORIDE, CO2, BUN, CR, GLC  COAGS: No results found for: PTT, INR, FIBR  POC: No results found for: BGM, HCG, HCGS  HEPATIC: No results found for: ALBUMIN, PROTTOTAL, ALT, AST, GGT, ALKPHOS, BILITOTAL, BILIDIRECT, MAIKEL  OTHER: No results found for: PH, LACT, A1C, NERISSA, PHOS, MAG, LIPASE, AMYLASE, TSH, T4, T3, CRP, SED    Anesthesia Plan    ASA Status:  2      Anesthesia Type: Epidural.              Consents    Anesthesia Plan(s) and associated risks, benefits, and realistic  alternatives discussed. Questions answered and patient/representative(s) expressed understanding.    - Discussed:     - Discussed with:  Patient         Postoperative Care            Comments:    Other Comments: Continuous Labor Epidural: Indication is for labor pain. Following an discussion of the procedure, epidural expectations, and risks and benefits (risks including, but not limited to, nerve damage, infection, bleeding/hematoma, inadvertent dural puncture, spinal headache, partial or failed block), the patient appears to understand and consents to proceed. Questions were encouraged and answered.        neg OB ROS.       Lashay Pardo MD

## 2022-02-24 NOTE — PROVIDER NOTIFICATION
02/23/22 1805   Provider Notification   Provider Name/Title Dr Dennis   Method of Notification At Bedside   Notification Reason SVE   MD bedside, sve and amniotomy done.

## 2022-02-24 NOTE — PROVIDER NOTIFICATION
02/23/22 1945   Provider Notification   Provider Name/Title    Method of Notification In Department   Notification Reason Status Update      updated on pt's status. Breathing through and coping with contractions. Second dose of fentanyl IV given at 1934. Pt will want an epidural soon. FHR category 1.

## 2022-02-24 NOTE — ANESTHESIA POSTPROCEDURE EVALUATION
Patient: Misa Carballo      S/P epidural for labor.   I or my partner was immediately available for management of this patient during epidural analgesia infusion.  VSS.  Doing well. Block resolved.  Neuro at baseline. Denies positional headache. Minimal side effects easily managed w/ PRN meds. No apparent anesthetic complications. No follow-up required.    Morales Hammer MD    Note:  Anesthesia Post Evaluation    Last vitals:  Vitals Value Taken Time   BP     Temp     Pulse     Resp     SpO2         Electronically Signed By: Morales Hammer MD  February 24, 2022  10:19 AM

## 2022-02-24 NOTE — PLAN OF CARE
Room orientation completed with patient including how to use the call light, calling for help, infant safety- including family calling a code, use of bulb syringe, safe sleep techniques, fall prevention, and plan of care. Parents and baby's ID Bracelets checked and verified.     Patient vitally stable. Voiding without issue. Tolerating regular diet. Ambulating steadily, independently. PIV saline locked. Fundal checks WDL. Lochia is scant, no clots. Laceration managed with ice, tucks pads, and malorie bottle. Pain adequately managed with Tylenol and Ibuprofen. Infant breastfeeding every 2-3 hours. Mother reports nipple soreness with breastfeeding and that this occurred with her first baby as well- in that case she  for 1 month and then exclusively pumped. Baby's latch appears excellent. Provided gel pads and motherlove cream. Mother interested in apno cream. Father of baby present and supportive. Both parents are attentive to infant.

## 2022-02-24 NOTE — ANESTHESIA PROCEDURE NOTES
Epidural catheter Procedure Note    Pre-Procedure   Staff -        Anesthesiologist:  Lashay Pardo MD       Performed By: anesthesiologist       Referred By: Sonny       Location: OB       Pre-Anesthestic Checklist: patient identified, IV checked, risks and benefits discussed, informed consent, monitors and equipment checked, pre-op evaluation and at physician/surgeon's request  Timeout:       Correct Patient: Yes        Correct Procedure: Yes        Correct Site: Yes        Correct Position: Yes   Procedure Documentation  Procedure: epidural catheter       Diagnosis: Labor analgesia       Patient Position: sitting       Skin prep: Chloraprep       Local skin infiltrated with 2 mL of 1% lidocaine.        Insertion Site: L2-3. (midline approach).       Technique: LORT saline        ANUPAM at 4 cm.       Needle Type: Touhy needle       Needle Gauge: 17.        Needle Length (Inches): 3.5        Catheter: 19 G.         Catheter threaded easily.         5 cm epidural space.         Threaded 9 cm at skin.         # of attempts: 1 and  # of redirects:  0    Assessment/Narrative         Paresthesias: No.     Test dose of 3 mL at.         Test dose negative, 3 minutes after injection, for signs of intravascular, subdural, or intrathecal injection.       Insertion/Infusion Method: LORT saline       Aspiration negative for Heme or CSF via Epidural Catheter.     Comments:  Procedure tolerated well without apparent complications. Initial MDA bolus of 0.125% bupivacaine + fentanyl given from pump. Epidural infusion (0.125% bupi with fentanyl 2mcg/ml) started at 10 ml/hr with PCEA of 5 ml q15min with a max cumulative dose of 25 ml/hr. PCEA instructions given and use encouraged PRN. Epidural expectations again reviewed and questions answered. Patient hemodynamically stable.  Patient and epidural functionality to be reassessed later via vital sign flowsheet, nursing communication, and/or patient report.  Anesthesiologist  immediately available for ongoing assessment and management.

## 2022-02-24 NOTE — PROGRESS NOTES
[facility] Postpartum Note    S:  Misa Carballo feels well this morning. Was not able to sleep last night. Pain control adequate. Lochia minimal. Voiding. Breast feeding. Mood Good.     O:  Vitals were reviewed  Blood pressure 112/75, pulse 84, temperature 98  F (36.7  C), resp. rate 16, unknown if currently breastfeeding.      General: healthy, alert and no distress  Abd: soft, appropriately tender, fundus firm  Legs: Non-tender, 1+ pitting edema    No results found for: RH  Rubella: immune    Assessment and Plan:   Postpartum Day #1, status post vaginal delivery, doing well.  -- Discharge home  -- F/U 6 weeks w/ Primary OB  -- Discharge meds: see med rec  -- Contraception: considering options    Uncomplicated     Suzanne Méndez MD

## 2022-02-24 NOTE — DISCHARGE SUMMARY
[facility] Discharge Summary    Misa Carballo MRN# [id number]   Age: 31 year old YOB: 1990     Date of Admission:  2022  Date of Discharge:  2022  Admitting Physician:  Priscilla Dennis MD  Discharge Physician:  Suzanne Méndez MD    Admit Dx:   -  at 39w4d   - History of fetal macrosomia  - Left ovarian cyst  - History of genital herpes   - Early labor    Discharge Dx:  - , s/p   - Same as above    Procedures:  - Spontaneous vaginal delivery  - Epidural analgesia    Admit HPI/Labor Course:  Misa Carballo is a 31 year old  at 39w4d by LMP c/w 8w1d US who was admitted for augmentation of early labor on 2022. Pregnancy was notable for history of macrosomia, left ovarian cyst and history of genital herpes. Upon admission her cervix was 3.5/70/-2. She received pitocin for augmentation. AROM was performed at 1808 with clear fluid. She received an epidural. She was complete at  and began pushing at . At  a vigorous female infant was delivered in the OA position with apgars of 9 and 9. Weight 3750g (8 4.3). Delayed cord clamping was done for >60 seconds. The placenta delivered spontaneously, intact with a 3 vessel cord at 2133. Pitocin was given after delivery of the baby. A 1st degree laceration was present and was repaired in the standard fashion with 3-0 Monocryl. QBL of 50mL. Sponge and sharp counts were correct.     Please see her Admission H&P and Delivery Summary for further details.    Postpartum Course:  Her postpartum course was uncomplicated. On PPD#1, she was meeting all of her postpartum goals and deemed stable for discharge. She was voiding without difficulty, tolerating a regular diet without nausea and vomiting, her pain was well controlled on oral pain medicines and her lochia was appropriate. Her hemoglobin prior to delivery was 13.1. Her Rh status was positive, and Rhogam was not indicated.      Discharge Medications:  Motrin, stool softener, nipple ointment, numbing spray    Discharge/Disposition:  Misa Carballo was discharged to home in stable condition with the following instructions/medications:  1) Call for temperature > 100.4, bright red vaginal bleeding >1 pad an hour x 2 hours, foul smelling vaginal discharge, pain not controlled by usual oral pain meds, persistent nausea and vomiting not controlled on medications  2) She desired to consider options for contraception.  3) For feeding she decided to breastfeed.  4) She was instructed to follow-up with her primary OB in 6 weeks for a routine postpartum visit.    Leah C. Henke, MD [facility]

## 2022-02-25 ASSESSMENT — ACTIVITIES OF DAILY LIVING (ADL): ADLS_ACUITY_SCORE: 3

## 2022-02-25 NOTE — PLAN OF CARE
Pt doing well. VSS. Tolerating regular diet well. Voiding without difficulty. Actively working on breastfeeding. Pain well controlled with Tylenol and Ibuprofen. Using ice packs and tucks for comfort. Positive attachment behaviors observed with infant.  at bed side, supportive and involved in care. Anticipate discharge later tonight.

## 2022-02-25 NOTE — LACTATION NOTE
"This note was copied from a baby's chart.  Lactation visit. Bianca is Hillary's second baby, she reports pain with latching with her first baby, unable to identify cause after extensive work with lactation professionals. She pivoted to exclusive pumping, reports she had a \"great\" milk supply with no pain issues with pumping. She has been nursing Bianca, but she is beginning to have the same concerns with pain. Hillary brought hand expressed colostrum into the hospital, writer thawed 5mL and brought to bedside for Bianca to take via bottle. Hillary plans on continuing to place Bianca to breast as tolerated, but verbalizes comfortability with pumping if needed. She has a pump at home. Paced bottle feeding reviewed, ordered APNO cream brought into room and discussed use. They are hoping to discharge at 24 hours later this evening, discharge education provided. Hillary is aware she may call for lactation support prior to discharge as needed.   "

## 2022-02-25 NOTE — PROGRESS NOTES
Patient stable and discharged home with Spouse and infant at 2359 on 2/24/2022. Discharge instructions reviewed and all questions answered.

## 2022-02-25 NOTE — DISCHARGE INSTRUCTIONS
Home care:    [phone]      Please call and schedule a 6 week postpartum follow up in clinic.     Postpartum Vaginal Delivery Instructions    Activity       Ask family and friends for help when you need it.    Do not place anything in your vagina for 6 weeks.    You are not restricted on other activities, but take it easy for a few weeks to allow your body to recover from delivery.  You are able to do any activities you feel up to that point.    No driving until you have stopped taking your pain medications (usually two weeks after delivery).     Call your health care provider if you have any of these symptoms:       Increased pain, swelling, redness, or fluid around your stiches from an episiotomy or perineal tear.    A fever above 100.4 F (38 C) with or without chills when placing a thermometer under your tongue.    You soak a sanitary pad with blood within 1 hour, or you see blood clots larger than a golf ball.    Bleeding that lasts more than 6 weeks.    Vaginal discharge that smells bad.    Severe pain, cramping or tenderness in your lower belly area.    A need to urinate more frequently (use the toilet more often), more urgently (use the toilet very quickly), or it burns when you urinate.    Nausea and vomiting.    Redness, swelling or pain around a vein in your leg.    Problems breastfeeding or a red or painful area on your breast.    Chest pain and cough or are gasping for air.    Problems coping with sadness, anxiety, or depression.  If you have any concerns about hurting yourself or the baby, call your provider immediately.     You have questions or concerns after you return home.     Keep your hands clean:  Always wash your hands before touching your perineal area and stitches.  This helps reduce your risk of infection.  If your hands aren't dirty, you may use an alcohol hand-rub to clean your hands. Keep your nails clean and short.

## 2022-04-10 ENCOUNTER — HEALTH MAINTENANCE LETTER (OUTPATIENT)
Age: 32
End: 2022-04-10

## 2022-10-15 ENCOUNTER — HEALTH MAINTENANCE LETTER (OUTPATIENT)
Age: 32
End: 2022-10-15

## 2023-06-01 ENCOUNTER — HEALTH MAINTENANCE LETTER (OUTPATIENT)
Age: 33
End: 2023-06-01

## 2024-08-04 ENCOUNTER — HEALTH MAINTENANCE LETTER (OUTPATIENT)
Age: 34
End: 2024-08-04